# Patient Record
Sex: FEMALE | Race: WHITE | NOT HISPANIC OR LATINO | Employment: FULL TIME | URBAN - METROPOLITAN AREA
[De-identification: names, ages, dates, MRNs, and addresses within clinical notes are randomized per-mention and may not be internally consistent; named-entity substitution may affect disease eponyms.]

---

## 2023-12-11 ENCOUNTER — OFFICE VISIT (OUTPATIENT)
Dept: DERMATOLOGY | Facility: CLINIC | Age: 63
End: 2023-12-11

## 2023-12-11 DIAGNOSIS — L98.8 WRINKLES: Primary | ICD-10-CM

## 2023-12-11 NOTE — PROGRESS NOTES
RESIDENT COSMETIC CLINIC    Subjective     Luann Morgan is a 63 y.o. female who presents for the following: Cosmetic (Resident cosmetic visit).     Review of Systems:  No other skin or systemic complaints other than what is documented elsewhere in the note.    The following portions of the chart were reviewed this encounter and updated as appropriate:          Skin Cancer History  No skin cancer on file.      Specialty Problems    None       Objective   Well appearing patient in no apparent distress; mood and affect are within normal limits.    A focused skin examination was performed. All findings within normal limits unless otherwise noted below.    Assessment/Plan   1. Wrinkles  Head - Anterior (Face)  Dynamic and static rhytids at the glabella, forehead and periorbital region          Botox is a neurotoxin which prevents muscles from kyle that can help soften and smooth fine lines/wrinkles.    Risks and benefits were discussed bruising and swelling, lid droop, dropping of eyebrow, asymmetrical smile. For continued improvement ongoing treatment with Botox will soften lines    Botox typically begins to work 3 days after injection, full effect by 14 days. Typically lasts about 3 months. Continued treatments will continue to help lines to soften over time. However, lines may not completely disappear.    After treatment do not lie flat for 4 hours and do not exercise for 24 hours after the procedure.     The follow regions were injected:  - Forehead: 10 units  - Glabella: 18 units  - Crow's feet: 12 units    Lot: [id number]  Expiration: 05/2026      Chemodenervation - Head - Anterior (Face)    Performed by: Young Negron MD  Authorized by: Alfreda Hammond MD      Cosmetic:  yesnot medical botulinum toxin injection    Consent:      Consent obtained:  Written     Consent given by:  Patient     Risks discussed:  Weakness, poor cosmetic result, bleeding, infection and pain     Alternatives discussed:  No  treatment    Universal protocol:      Relevant documents present and verified:  Yes       Site/side verified:  Yes       Immediately prior to procedure a time out was called:  Yes       Patient identity confirmed:  Verbally with patient    Procedure details:      Diluted by:  Preservative free saline     Toxin (Brand):  [id number]-A (Botox)      Post-procedure details:      Patient tolerance of procedure:  Tolerated well, no immediate complications    Young Negron MD   PGY3, Department of Dermatology    I was present for the entirety of the procedure(s).  I saw and evaluated the patient. I personally obtained the key and critical portions of the history and physical exam or was physically present for key and critical portions performed by the resident/fellow. I reviewed the resident/fellow's documentation and discussed the patient with the resident/fellow. I agree with the resident/fellow's medical decision making as documented in the note and made changes where appropriate.    Alfreda Hammond MD

## 2024-06-13 ENCOUNTER — LAB REQUISITION (OUTPATIENT)
Dept: DERMATOPATHOLOGY | Facility: CLINIC | Age: 64
End: 2024-06-13
Payer: COMMERCIAL

## 2024-06-13 DIAGNOSIS — L98.9 DISORDER OF THE SKIN AND SUBCUTANEOUS TISSUE, UNSPECIFIED: ICD-10-CM

## 2024-06-13 PROCEDURE — 88321 CONSLTJ&REPRT SLD PREP ELSWR: CPT | Performed by: DERMATOLOGY

## 2024-06-14 LAB
PATH REPORT.FINAL DX SPEC: NORMAL
PATH REPORT.GROSS SPEC: NORMAL
PATH REPORT.RELEVANT HX SPEC: NORMAL
PATH REPORT.TOTAL CANCER: NORMAL
PATHOLOGY SYNOPTIC REPORT: NORMAL

## 2024-06-16 DIAGNOSIS — C43.9 MELANOMA OF SKIN (MULTI): ICD-10-CM

## 2024-06-16 NOTE — TUMOR BOARD NOTE
General Patient Information  Name:  Luann Morgan  Evaluation #:  1  Conference Date:  6/16/2024  YOB: 1960  MRN:  [id number]  Program Physician(s):  Cooper Livingston  Referring Physician(s):  Celine Alexandra(PCP)      Summary   Stage:  c0 ([id number])    Assessment:  Melanoma in situ of the right thigh. (Outside path should lentigo maligna type)    Recommendation:  Mohs surgery    Review Multidisciplinary Cutaneous Oncology Conference recommendation with patient.  Continue routine follow up and total body skin exams with Celine eMndez.    Follow Up:  Cooper Walker.      History and Physical Exam  Dermatologic History:   63 y.o. female with a biopsy of the right thigh on 6/13/2024 showing a melanoma in situ. (Outside path showed lentigo maligna type)    She is scheduled for Mohs surgery with Dr. Livingston on 7/25/2024.      Pathology  Derm Consult: [id number]  Order: [id number]   Collected 6/13/2024 10:27       Status: Final result       Visible to patient: No (inaccessible in [facility])       Dx: Disorder of the skin and subcutaneous...    0 Result Notes      Component    FINAL DIAGNOSIS   2 SLIDES, [facility], #[id number] BX: (6/5/24)      SKIN, RIGHT THIGH, SHAVE BIOPSY:  MELANOMA IN SITU, PRESENT ON THE DEEP AND PERIPHERAL MARGIN, SEE NOTE.     Note: Microscopic examination reveals a specimen that extends into the mid reticular dermis. There is mild solar elastosis and there is an asymmetric proliferation of nested and single atypical melanocytes throughout all layers of the epidermis.     ** Electronically signed out by Mallory Chris MD **      Electronically signed by Mallory Chris MD on 6/14/2024 at 1234   Case Summary Report   MELANOMA OF THE SKIN: Biopsy   8th Edition - Protocol posted: 3/23/2022MELANOMA OF THE SKIN: BIOPSY - All Specimens  SPECIMEN   Procedure  Biopsy, shave   Specimen Laterality  Right   TUMOR   Tumor Site  Skin of lower  limb and hip: Right thigh          Histologic Type  Melanoma in situ, superficial spreading type (low-cumulative sun damage (CSD) melanoma in situ)   Ulceration  Not identified   Tumor Regression  Not identified   MARGINS     Margin Status for Melanoma in Situ  Melanoma in situ present at margin   Margin(s) Involved by Melanoma in Situ  Peripheral     Deep   PATHOLOGIC STAGE CLASSIFICATION (pTNM, AJCC 8th Edition)     pT Category  pTis   .

## 2024-06-17 ENCOUNTER — TUMOR BOARD CONFERENCE (OUTPATIENT)
Dept: HEMATOLOGY/ONCOLOGY | Facility: HOSPITAL | Age: 64
End: 2024-06-17
Payer: COMMERCIAL

## 2024-07-25 ENCOUNTER — APPOINTMENT (OUTPATIENT)
Dept: DERMATOLOGY | Facility: CLINIC | Age: 64
End: 2024-07-25
Payer: COMMERCIAL

## 2024-07-25 VITALS — SYSTOLIC BLOOD PRESSURE: 171 MMHG | HEART RATE: 62 BPM | DIASTOLIC BLOOD PRESSURE: 99 MMHG

## 2024-07-25 DIAGNOSIS — C43.71 MALIGNANT MELANOMA OF RIGHT LOWER EXTREMITY INCLUDING HIP (MULTI): ICD-10-CM

## 2024-07-25 PROCEDURE — 17313 MOHS 1 STAGE T/A/L: CPT | Performed by: DERMATOLOGY

## 2024-07-25 PROCEDURE — 88342 IMHCHEM/IMCYTCHM 1ST ANTB: CPT | Performed by: DERMATOLOGY

## 2024-07-25 NOTE — PROGRESS NOTES
Mohs Surgery Operative Note    Date of Surgery:  7/25/2024  Surgeon:  Cooper Livingston MD  Office Location: [address]   [address] [address]  Dept: [phone]  Dept Fax: [phone]  Referring Provider: Celine Mendez MD  [address] [place] [address]      Assessment/Plan   Pre-procedure:   Obtained informed consent: written from patient  The surgical site was identified and confirmed with the patient.     Intra-operative:   Audible time out called at : 8:45 AM 07/25/24  by: Sacha St MA   Verified patient name, birthdate, site, specimen bottle label & requisition.    The planned procedure(s) was again reviewed with the patient. The risks of bleeding, infection, nerve damage and scarring were reviewed. Written authorization was obtained. The patient identity, surgical site, and planned procedure(s) were verified. The provider acted as both surgeon and pathologist.     Malignant melanoma of right lower extremity including hip (Multi)  Right Thigh  Mohs surgery  Consent obtained: written    Universal Protocol:  Procedure explained and questions answered to patient or proxy's satisfaction: Yes    Test results available and properly labeled: Yes    Pathology report reviewed: Yes    External notes reviewed: Yes    Photo or diagram used for site identification: Yes    Site/side marked: Yes    Slide independently reviewed by Mohs surgeon: Yes    Immediately prior to procedure a time out was called: Yes    Patient identity confirmed: verbally with patient  Preparation: Patient was prepped and draped in usual sterile fashion      Anticoagulation:  Is the patient taking prescription anticoagulant and/or aspirin prescribed/recommended by a physician? No    Was the anticoagulation regimen changed prior to Mohs? No      Anesthesia:  Anesthesia method: local infiltration  Local anesthetic: lidocaine 1% WITH epi    Procedure  Details:  Biopsy accession number: [id number](outside path)  Date of biopsy: 6/13/2024  Pre-Op diagnosis: melanoma  Melanoma subtype: in situ  Melanoma histologic subtype: lentigo maligna  Surgery side: right  Surgical site (from skin exam): Right Thigh  Pre-operative length (cm): 2.1  Pre-operative width (cm): 1.8  Indications for Mohs surgery: ill-defined borders, size > 2 cm  Previously treated? No      Micrographic Surgery Details:  Post-operative length (cm): 2.3  Post-operative width (cm): 1.8  Number of Mohs stages: 1    Stage 1     Comments: The patient was brought into the operating room and placed in the procedure chair in the appropriate position.  The area positive by previous biopsy was identified and confirmed with the patient. The area of clinically obvious tumor was debulked using a curette and/or scalpel as needed. An incision was made following the Mohs approach through the skin. The specimen was taken to the lab, divided into 3 piece(s) and appropriately chromacoded and processed.         Tumor features identified on Mohs section: no tumor identified   Immunohistochemical stains utilized: MART-1    Depth of defect: subcutaneous fat    Patient tolerance of procedure: tolerated well, no immediate complications    Reconstruction:  Was the defect reconstructed?: No    Fine/surface layer approximation (top stitches)   Hemostasis achieved with: pressure, Gelfoam and electrodesiccation  Outcome: patient tolerated procedure well with no complications    Post-procedure details: sterile dressing applied and wound care instructions given    Dressing type: pressure dressing    Additional details:  Melanoma Efrain:   Curative Intent: Yes  Original Breslow Thickness: 0 mm  Clinical margin width: Other - Mohs, individual anatomic or functional considerations per the NCCN guidelines  Depth of excision: Other - Mohs, individual anatomic or functional considerations per the NCCN guidelines  Discussion/Procedural  Comments:       The final repair measured 2.3 x 1.8 cm          Wound care was discussed, and the patient was given written post-operative wound care instructions.      The patient will follow up with Cooper Livingston MD as needed for any post operative problems or concerns, and will follow up with their primary dermatologist as scheduled.

## 2024-07-25 NOTE — LETTER
MOH's Provider/Referral Letter Treatment Plan    Patient: Luann Morgan   YOB: 1960   Date of Visit: 7/25/2024   MRN: [id number]     Celine Mendez MD  [facility] [id number]    Dear Celine Mendez MD,     I had the pleasure of seeing Luann Morgan today in consultation at your request for evaluation and treatment of:  1. Malignant melanoma of right lower extremity including hip (Multi)  Right Thigh    Mohs surgery    Staff Communication: Dermatology Local Anesthesia: 1 % Lidocaine / Epinephrine - Amount:6cc's      Mohs surgery was indicated because of the nature of the lesion and the need to obtain the highest cure rate.  After informed consent was obtained, the patient underwent the procedure without complication.    The skin cancer was removed, wound care instructions were given and the patient was advised to follow up with you.  I will see the patient post-operatively as indicated.    Thank you very much for your confidence in me and for allowing me to share in the care of this patient.    1. Malignant melanoma of right lower extremity including hip (Multi)  Right Thigh  Is a 2.0 x 1.8 cm scar    Mohs surgery    Consent obtained: written    Universal Protocol:  Procedure explained and questions answered to patient or proxy's satisfaction: Yes    Test results available and properly labeled: Yes    Pathology report reviewed: Yes    External notes reviewed: Yes    Photo or diagram used for site identification: Yes    Site/side marked: Yes    Slide independently reviewed by Mohs surgeon: Yes    Immediately prior to procedure a time out was called: Yes    Patient identity confirmed: verbally with patient  Preparation: Patient was prepped and draped in usual sterile fashion      Anticoagulation:  Is the patient taking prescription anticoagulant and/or aspirin prescribed/recommended by a physician? No    Was the anticoagulation regimen changed prior to Mohs? No       Anesthesia:  Anesthesia method: local infiltration  Local anesthetic: lidocaine 1% WITH epi    Procedure Details:  Biopsy accession number: [id number](outside path)  Date of biopsy: 6/13/2024  Pre-Op diagnosis: melanoma  Melanoma subtype: in situ  Melanoma histologic subtype: lentigo maligna  Surgery side: right  Surgical site (from skin exam): Right Thigh  Pre-operative length (cm): 2  Pre-operative width (cm): 1.8  Indications for Mohs surgery: ill-defined borders  Previously treated? No      Micrographic Surgery Details:  Post-operative length (cm): 2.3  Post-operative width (cm): 1.8  Number of Mohs stages: 1    Stage 1     Comments: The patient was brought into the operating room and placed in the procedure chair in the appropriate position.  The area positive by previous biopsy was identified and confirmed with the patient. The area of clinically obvious tumor was debulked using a curette and/or scalpel as needed. An incision was made following the Mohs approach through the skin. The specimen was taken to the lab, divided into 3 piece(s) and appropriately chromacoded and processed.     Tumor features identified on Mohs section: no tumor identified     Immunohistochemical stains utilized: MART-1    Depth of defect: subcutaneous fat    Patient tolerance of procedure: tolerated well, no immediate complications    Reconstruction:  Was the defect reconstructed?: No    Fine/surface layer approximation (top stitches)   Hemostasis achieved with: pressure, Gelfoam and electrodesiccation  Outcome: patient tolerated procedure well with no complications    Post-procedure details: sterile dressing applied and wound care instructions given    Dressing type: pressure dressing    Additional details:  Melanoma Efrain:   Curative Intent: Yes  Original Breslow Thickness: 0 mm  Clinical margin width: Other - Mohs, individual anatomic or functional considerations per the NCCN guidelines  Depth of excision: Other - Mohs,  individual anatomic or functional considerations per the NCCN guidelines  Discussion/Procedural Comments:       Staff Communication: Dermatology Local Anesthesia: 1 % Lidocaine / Epinephrine - Amount:6cc's           Sincerely,       Cooper Livingston MD  [facility]

## 2024-07-25 NOTE — PROGRESS NOTES
Melanoma Mohs Surgery Consult Note    Date of Surgery:  7/25/2024  Surgeon:  Cooper Livingston MD  Office Location: [address]   [address] [address]  Dept: [phone]  Dept Fax: [phone]   Referring Provider: Celine Mendez MD  [address] [place] [address]     Crow Morgan is a 63 y.o. female who presents for the following: MOHS Surgery for melanoma.    According to the patient, the lesion has been present for approximately greater than 1 year at the time of diagnosis.  The lesion is not causing symptoms.  The lesion has not been treated previously.    The patient does not have a pacemaker / defibrillator.  The patient does not have a heart valve / joint replacement.    The patient is not on blood thinners.  The patient does not have a history of hepatitis B or C.  The patient does not have a history of HIV.  The patient does not have a history of immunosuppression (e.g. organ transplantation, malignancy, medications)    The following portions of the chart were reviewed this encounter and updated as appropriate:       Review of Systems:  No other skin or systemic complaints other than what is documented elsewhere in the note.    Medical History:  Clinically relevant history including significant past medical history, review of systems, medications and allergies was reviewed and is documented in Epic.    Objective   Well appearing patient in no apparent distress; mood and affect are within normal limits.  Vital signs: See record.  Noted on the Right Thigh  Is a 2.0 x 1.8 cm scar      The patient confirmed the identified site.    Discussion:  The nature of the diagnosis was explained. The lesion is an early melanoma but is likely to have been present for >1 year and is likely to progress without treatment. The multidisciplinary cutaneous oncology tumor board report was reviewed with the patient and surgery is recommended.  The patient was informed that based on the depth and lack of ulceration, a sentinel lymph node biopsy is not indicated. However, the melanoma may be upstaged after excision following histopathologic examination, which may require additional treatment. Warning signs of melanoma were discussed. We recommended that the patient have regular total body skin exams given increased risk of skin cancers. The patient was instructed to use sun protective behaviors including use of broad spectrum sunscreens and sun protective clothing to reduce the risk of skin cancers.     Risks, benefits, side effects of Mohs surgery were discussed with patient and the patient voiced understanding.  It was explained that even though the cure rate of Mohs is very high it is not 100%. Risks of surgery including but not limited to bleeding, infection, numbness, nerve damage, and scar were reviewed.  Discussion included wound care requirements, activity restrictions, likely scar outcome and time to heal.     After Mohs surgery, the defect may need to be repaired surgically and the scar may be longer than the original lesion.  Reconstruction options, risks, and benefits were reviewed including second intention healing, linear repair (4-1 ratio was explained), local flaps, skin grafts, cartilage grafts and interpolation flaps (the need for multiple surgeries was explained). Possible outcomes were reviewed including likely scar appearance, failure of flap survival, infection, bleeding and the need for revision surgery.     Medical Decision Making:    Column 1 - chronic illness with progression  Column 2 - category 3: discussion of management with external physicians (multidisciplinary tumor board)  Column 3 - decision regarding minor surgery with identified risk factors (bleeding, infection, scarring)

## 2024-10-18 DIAGNOSIS — C43.9 MELANOMA OF SKIN (MULTI): ICD-10-CM

## 2024-10-18 NOTE — TUMOR BOARD NOTE
General Patient Information  Name:  Luann Morgan  Evaluation #:  2  Conference Date:  10/21/2024  YOB: 1960  MRN:  [id number]  Program Physician(s):  Cooper Livingston  Referring Physician(s):  Celine Alexandra(PCP)      Summary   Stage:  0 ([id number])    Assessment:  Melanoma in situ of the right thigh. (Outside path should lentigo maligna type). S/p one stage of Mohs surgery with complete clearance. The debulking layer showed scar. Adequately surgically treated.    Recommendation:  Annual H&P.    Review Multidisciplinary Cutaneous Oncology Conference recommendation with patient.  Continue routine follow up and total body skin exams with Celine Mednez.    Follow Up:  Cooper Walker.      History and Physical Exam  Dermatologic History:   63 y.o. female with a biopsy of the right thigh on 6/13/2024 showing a melanoma in situ. (Outside path showed lentigo maligna type)  She underwent one stage of Mohs surgery on 7/25/2024 with complete clearance. The debulking layer showed scar.    Pathology  Derm Consult: [id number]  Order: [id number]   Collected 6/13/2024 10:27       Status: Final result       Visible to patient: No (inaccessible in [facility])       Dx: Disorder of the skin and subcutaneous...    0 Result Notes      Component    FINAL DIAGNOSIS   2 SLIDES, [facility], #[id number] BX: (6/5/24)      SKIN, RIGHT THIGH, SHAVE BIOPSY:  MELANOMA IN SITU, PRESENT ON THE DEEP AND PERIPHERAL MARGIN, SEE NOTE.     Note: Microscopic examination reveals a specimen that extends into the mid reticular dermis. There is mild solar elastosis and there is an asymmetric proliferation of nested and single atypical melanocytes throughout all layers of the epidermis.     ** Electronically signed out by Mallory Chris MD **      Electronically signed by Mallory Chris MD on 6/14/2024 at 1234   Case Summary Report   MELANOMA OF THE SKIN: Biopsy   8th Edition - Protocol  posted: 3/23/2022MELANOMA OF THE SKIN: BIOPSY - All Specimens  SPECIMEN   Procedure  Biopsy, shave   Specimen Laterality  Right   TUMOR   Tumor Site  Skin of lower limb and hip: Right thigh          Histologic Type  Melanoma in situ, superficial spreading type (low-cumulative sun damage (CSD) melanoma in situ)   Ulceration  Not identified   Tumor Regression  Not identified   MARGINS     Margin Status for Melanoma in Situ  Melanoma in situ present at margin   Margin(s) Involved by Melanoma in Situ  Peripheral     Deep   PATHOLOGIC STAGE CLASSIFICATION (pTNM, AJCC 8th Edition)     pT Category  pTis   .

## 2024-10-21 ENCOUNTER — TUMOR BOARD CONFERENCE (OUTPATIENT)
Dept: HEMATOLOGY/ONCOLOGY | Facility: HOSPITAL | Age: 64
End: 2024-10-21
Payer: COMMERCIAL

## 2024-12-09 ENCOUNTER — APPOINTMENT (OUTPATIENT)
Dept: DERMATOLOGY | Facility: CLINIC | Age: 64
End: 2024-12-09

## 2024-12-09 DIAGNOSIS — L98.8 RHYTIDES: Primary | ICD-10-CM

## 2024-12-09 DIAGNOSIS — L98.8 WRINKLES: ICD-10-CM

## 2024-12-09 NOTE — PROGRESS NOTES
Resident FREE Cosmetic Clinic Note    Subjective     Luann Morgan is a 64 y.o. female who presents for the following: cosmetic visit    Follow up for botox. Last treatment was 12/11/23.  Reports good response after last visit.     Skin Cancer History  No skin cancer on file.    Objective   Well appearing patient in no apparent distress; mood and affect are within normal limits.    A focused examination was performed. All findings within normal limits unless otherwise noted below.    Assessment/Plan   Rhytides    Wrinkles  Head - Anterior (Face)    Botox is a neurotoxin which prevents muscles from kyle that can help soften and smooth fine lines/wrinkles.    Risks and benefits were discussed bruising and swelling, lid droop, dropping of eyebrow, asymmetrical smile. For continued improvement ongoing treatment with Dysport will soften lines    Dysport typically begins to work 3 days after injection, full effect by 14 days. Typically lasts about 3 months. Continued treatments will continue to help lines to soften over time. However, lines may not completely disappear.    After treatment do not lie flat for 4 hours and do not exercise for 24 hours after the procedure.     The follow regions were injected:  - Forehead: 10 units  - Glabella: 18 units  - Crow's feet: 12 units    Dysport:  Lot: [id number]  Expiration: 02/28/2026    Chemodenervation - Head - Anterior (Face)    Date/Time: 12/9/2024 2:27 PM    Performed by: Shereen Moulton MD  Authorized by: Alfreda Hammond MD      Cosmetic:  yes  Medical - Botulinum toxin injection:  yes    Consent:      Consent obtained:  Written     Consent given by:  Patient     Risks discussed:  Weakness, poor cosmetic result, pain, infection and bleeding     Alternatives discussed:  No treatment    Universal protocol:      Relevant documents present and verified:  Yes       Site/side verified:  Yes       Immediately prior to procedure a time out was called:  Yes       Patient  identity confirmed:  Verbally with patient    Pre-procedure details:   Prep type:  Isopropyl alcohol    Procedure details:      Diluted by:  Preservative free saline     Toxin (Brand):  AboBoNT-A (Dysport)    Total units injected:  40    Post-procedure details:      Patient tolerance of procedure:  Tolerated well, no immediate complications    Comments: Dysport  LOT [id number]  EXP 02/28/2024    Shereen Moulton MD  PGY-5 Dermatology Resident    I saw and evaluated the patient. I personally obtained the key and critical portions of the history and physical exam or was physically present for key and critical portions performed by the resident/fellow. I reviewed the resident/fellow's documentation and discussed the patient with the resident/fellow. I agree with the resident/fellow's documented findings and plan of care.     Alfreda Hammond MD